# Patient Record
Sex: MALE | Race: WHITE
[De-identification: names, ages, dates, MRNs, and addresses within clinical notes are randomized per-mention and may not be internally consistent; named-entity substitution may affect disease eponyms.]

---

## 2019-12-18 ENCOUNTER — HOSPITAL ENCOUNTER (EMERGENCY)
Dept: HOSPITAL 44 - ED | Age: 74
Discharge: HOME | End: 2019-12-18
Payer: MEDICARE

## 2019-12-18 VITALS — SYSTOLIC BLOOD PRESSURE: 132 MMHG | DIASTOLIC BLOOD PRESSURE: 74 MMHG

## 2019-12-18 DIAGNOSIS — K59.00: Primary | ICD-10-CM

## 2019-12-18 DIAGNOSIS — R33.9: ICD-10-CM

## 2019-12-18 PROCEDURE — 51702 INSERT TEMP BLADDER CATH: CPT

## 2019-12-18 PROCEDURE — 74022 RADEX COMPL AQT ABD SERIES: CPT

## 2019-12-18 PROCEDURE — 99283 EMERGENCY DEPT VISIT LOW MDM: CPT

## 2019-12-18 PROCEDURE — 81002 URINALYSIS NONAUTO W/O SCOPE: CPT

## 2019-12-18 RX ADMIN — TAMSULOSIN HYDROCHLORIDE ONE MG: 0.4 CAPSULE ORAL at 22:44

## 2019-12-18 RX ADMIN — HYDROCODONE BITARTRATE AND ACETAMINOPHEN ONE EACH: 5; 325 TABLET ORAL at 22:44

## 2019-12-18 NOTE — DIAGNOSTIC IMAGING REPORT
PATIENT MR#:   [id number]

PATIENT ACCT#: [id number]

PATIENT NAME:  KAVYA SHIPLEY

YOB: 1945

REFERRING PHYSICIAN: Pao Arceo

EXAM DATE:        12/18/2019

ACCESSION NUMBER: [id number]

EXAM DESCRIPTION: ABD SERIES PA CHEST



ABD SERIES PA CHEST



History: abd pain, unable to urinate (Hx) / 



Findings:  The heart size is normal.  The lungs are clear.  No pleural effusion or pneumothorax is id
entified.  There is

an acute posterior lateral 5th rib fracture present with mild displacement.



The bowel gas pattern is normal.  No free air is present.  Mild amount of retained stool is present. 
 No abnormal

intra-abdominal calcifications are present.



Impression:

1. No acute pulmonary disease.



2. Right acute 5th rib fracture.



2. Mild amount of retained stool.



Electronically signed by: Juan Mclean on 12/18/2019 22:21:03









Read by:        Dr. Juan Mclean

Transcribed by:   

Transcribed Date: 

Electronically signed by: Dr. Juan Mclean

Date signed: 12/18/2019 10:25:03 PM

## 2019-12-18 NOTE — ED PHYSICIAN DOCUMENTATION
General Adult





- HISTORIAN


Historian: patient





- HPI


Stated Complaint: unable to urinate


Chief Complaint: General Adult


Additional Information: 





Patient presents to ED with complaints of unable to urinated since 0700.  

Patient also reports constipation.  This morning he used digital dis-impaction 

to aleviate his constipation.  Since that time he has been unable to urinate. 


Onset: hours (12)


Timing: still present


Severity: moderate





- ROS


CONST: denies: fever


EYES/ENT: denies: problems with vision


CVS/RESP: denies: chest pain, shortness of breath


GI/: problems urinating, other (constipation).  denies: vomiting, nausea


MS/SKIN/LYMPH: denies: leg swelling


NEURO/PSYCH: denies: headache





- PAST HX


Past History: none


Other History: diabetes Type 2, other (Prostates cancer)


Surgeries/Procedures: other (CABG)


Allergies/Adverse Reactions: 


                                    Allergies











Allergy/AdvReac Type Severity Reaction Status Date / Time


 


No Known Allergies Allergy   Verified 19 21:42














Home Medications: 


                                Ambulatory Orders











 Medication  Instructions  Recorded


 


Aspirin [Aspirin EC] 81 mg PO DAILY 19


 


Atorvastatin Calcium 40 mg PO DAILY 19


 


Calcium Carbonate/Vitamin D3 500 mg PO DAILY 19





[Calcium 500 + Vit D 200 Tablet]  


 


Insulin Aspart [Novolog] 4 units SQ AM 19


 


Insulin Aspart [Novolog] 6 units SQ PM 19


 


Insulin Glargine,Hum.rec.anlog 20 units SQ HS 19





[Lantus]  


 


Metformin HCl 1,000 mg PO BID 19


 


Metoprolol Tartrate [Lopressor] 12.5 mg PO BID 19














- SOCIAL HX


Smoking History: non-smoker


Alcohol Use: none


Drug Use: none





- FAMILY HX


Family History: No





- REVIEWED ASSESSMENTS


Nursing Assessment  Reviewed: Yes


Vitals Reviewed: Yes





ED Results Lab/Radiology





- Radiology


Radiology Impressions: 





Report Submission Date: Dec 18, 2019 10:21:03 PM CST


Patient       Study


Name:   KAVYA SHIPLEY       Date:   Dec 18, 2019 9:30:17 PM CST


MRN:   [id number]       Modality Type:   DX


Gender:   M       Description:   ABD SERIES PA CHEST


:   41       Institution:   [facility]


Physician:   MAKI GOLD


     Accession:    [id number]


 


 


ABD SERIES PA CHEST





History: abd pain, unable to urinate (Hx) /





Findings:  The heart size is normal.  The lungs are clear.  No pleural effusion 

or pneumothorax is identified.  There is an acute posterior lateral 5th rib 

fracture present with mild displacement.





The bowel gas pattern is normal.  No free air is present.  Mild amount of 

retained stool is present.  No abnormal intra-abdominal calcifications are 

present.





Impression:


1. No acute pulmonary disease.





2. Right acute 5th rib fracture.





2. Mild amount of retained stool.


 


Electronically signed on Dec 18, 2019 10:21:03 PM CST by:


Juan Mclean





General Adult Physical Exam





- PHYSICAL EXAM


GENERAL APPEARANCE: no distress


EENT: MARILOU


NECK: thyroid normal


RESPIRATORY: no resp distress, breath sounds normal


CVS: reg rate & rhythm, heart sounds normal


ABDOMEN: non-tender, decreased BS, other (distended)


BACK: normal inspection, no CVA tenderness


SKIN: warm/dry


EXTREMITIES: non-tender, no edema


NEURO: oriented X3, mood/affect nml





Discharge


Clincal Impression: 


 Acute urinary retention





Constipation


Qualifiers:


 Constipation type: unspecified constipation type Qualified Code(s): K59.00 - 

Constipation, unspecified





Referrals: 


Primary Doctor,No [Primary Care Provider] - 2 Days


Additional Instructions: 


1.  Senokot and colace daily to maintain daily bowel movements


2.  Refrain from digital rectal stool removal.  Use suppositories (dulcolax) 

and/or enemas (fleets) to remove hard stools from rectum


3.  Keep veronica catheter in place until re-evaluated by PCP 


4.  Take flomax daily for next 7 days


5.  Follow up with PCP as soon as possible.   Discuss removal of veronica catheter.


6.  Return to ER for new or worsening symptoms. 


Condition: Stable


Disposition: 01 HOME, SELF-CARE


Decision to Admit: NO


Date of Decison to Admit: 19


Decision Time: 22:42

## 2019-12-19 LAB
APPEARANCE UR: CLEAR
COLOR,URINE: YELLOW
PH UR STRIP: 5.5 [PH] (ref 5–8)
RBC UR QL: NEGATIVE
UROBILINOGEN URINE: 0.2 EU (ref 0.2–1)